# Patient Record
Sex: FEMALE | Race: ASIAN | NOT HISPANIC OR LATINO | URBAN - METROPOLITAN AREA
[De-identification: names, ages, dates, MRNs, and addresses within clinical notes are randomized per-mention and may not be internally consistent; named-entity substitution may affect disease eponyms.]

---

## 2024-03-05 ENCOUNTER — EMERGENCY (EMERGENCY)
Facility: HOSPITAL | Age: 44
LOS: 1 days | Discharge: HOME | End: 2024-03-05
Admitting: EMERGENCY MEDICINE
Payer: MEDICAID

## 2024-03-05 VITALS
SYSTOLIC BLOOD PRESSURE: 115 MMHG | TEMPERATURE: 98 F | OXYGEN SATURATION: 97 % | RESPIRATION RATE: 17 BRPM | HEART RATE: 101 BPM | DIASTOLIC BLOOD PRESSURE: 78 MMHG

## 2024-03-05 LAB
ALBUMIN SERPL ELPH-MCNC: 4.1 G/DL — SIGNIFICANT CHANGE UP (ref 3.3–5)
ALP SERPL-CCNC: 55 U/L — SIGNIFICANT CHANGE UP (ref 40–120)
ALT FLD-CCNC: 8 U/L — SIGNIFICANT CHANGE UP (ref 4–33)
ANION GAP SERPL CALC-SCNC: 13 MMOL/L — SIGNIFICANT CHANGE UP (ref 7–14)
AST SERPL-CCNC: 11 U/L — SIGNIFICANT CHANGE UP (ref 4–32)
BILIRUB SERPL-MCNC: 0.6 MG/DL — SIGNIFICANT CHANGE UP (ref 0.2–1.2)
BUN SERPL-MCNC: 8 MG/DL — SIGNIFICANT CHANGE UP (ref 7–23)
CALCIUM SERPL-MCNC: 9.6 MG/DL — SIGNIFICANT CHANGE UP (ref 8.4–10.5)
CHLORIDE SERPL-SCNC: 104 MMOL/L — SIGNIFICANT CHANGE UP (ref 98–107)
CO2 SERPL-SCNC: 23 MMOL/L — SIGNIFICANT CHANGE UP (ref 22–31)
CREAT SERPL-MCNC: 0.5 MG/DL — SIGNIFICANT CHANGE UP (ref 0.5–1.3)
EGFR: 119 ML/MIN/1.73M2 — SIGNIFICANT CHANGE UP
ERYTHROCYTE [SEDIMENTATION RATE] IN BLOOD: 26 MM/HR — HIGH (ref 4–25)
GLUCOSE SERPL-MCNC: 91 MG/DL — SIGNIFICANT CHANGE UP (ref 70–99)
HCT VFR BLD CALC: 36.7 % — SIGNIFICANT CHANGE UP (ref 34.5–45)
HGB BLD-MCNC: 12.3 G/DL — SIGNIFICANT CHANGE UP (ref 11.5–15.5)
MCHC RBC-ENTMCNC: 28.3 PG — SIGNIFICANT CHANGE UP (ref 27–34)
MCHC RBC-ENTMCNC: 33.5 GM/DL — SIGNIFICANT CHANGE UP (ref 32–36)
MCV RBC AUTO: 84.4 FL — SIGNIFICANT CHANGE UP (ref 80–100)
NRBC # BLD: 0 /100 WBCS — SIGNIFICANT CHANGE UP (ref 0–0)
NRBC # FLD: 0 K/UL — SIGNIFICANT CHANGE UP (ref 0–0)
PLATELET # BLD AUTO: 235 K/UL — SIGNIFICANT CHANGE UP (ref 150–400)
POTASSIUM SERPL-MCNC: 4 MMOL/L — SIGNIFICANT CHANGE UP (ref 3.5–5.3)
POTASSIUM SERPL-SCNC: 4 MMOL/L — SIGNIFICANT CHANGE UP (ref 3.5–5.3)
PROT SERPL-MCNC: 7.7 G/DL — SIGNIFICANT CHANGE UP (ref 6–8.3)
RBC # BLD: 4.35 M/UL — SIGNIFICANT CHANGE UP (ref 3.8–5.2)
RBC # FLD: 12.9 % — SIGNIFICANT CHANGE UP (ref 10.3–14.5)
SODIUM SERPL-SCNC: 140 MMOL/L — SIGNIFICANT CHANGE UP (ref 135–145)
URATE SERPL-MCNC: 3.6 MG/DL — SIGNIFICANT CHANGE UP (ref 2.5–7)
WBC # BLD: 7.12 K/UL — SIGNIFICANT CHANGE UP (ref 3.8–10.5)
WBC # FLD AUTO: 7.12 K/UL — SIGNIFICANT CHANGE UP (ref 3.8–10.5)

## 2024-03-05 PROCEDURE — 73600 X-RAY EXAM OF ANKLE: CPT | Mod: 26,RT

## 2024-03-05 PROCEDURE — 73620 X-RAY EXAM OF FOOT: CPT | Mod: 26,RT

## 2024-03-05 PROCEDURE — 99284 EMERGENCY DEPT VISIT MOD MDM: CPT

## 2024-03-05 RX ORDER — IBUPROFEN 200 MG
600 TABLET ORAL ONCE
Refills: 0 | Status: COMPLETED | OUTPATIENT
Start: 2024-03-05 | End: 2024-03-05

## 2024-03-05 RX ADMIN — Medication 600 MILLIGRAM(S): at 17:47

## 2024-03-05 NOTE — ED PROVIDER NOTE - OBJECTIVE STATEMENT
Accompanied with her  in which he requests Divehi translation  43-year-old female with no significant medical history presents with right ankle and foot pain x 4 days.  States she awoke Friday morning with the pain denying any known injury or trauma prior.  Has been taking Motrin intermittently with mild relief.  Very difficult to bear weight.  Denies history of gout.  Denies fever, chills, abdominal pain, nausea, vomiting, calf pain, shortness of breath, recent travel, OCP use, weakness, numbness, tingling.

## 2024-03-05 NOTE — ED PROVIDER NOTE - CLINICAL SUMMARY MEDICAL DECISION MAKING FREE TEXT BOX
Accompanied with her  in which he requests Tajik translation  43-year-old female with no significant medical history presents with right ankle and foot pain x 4 days.  States she awoke Friday morning with the pain denying any known injury or trauma prior.  Has been taking Motrin intermittently with mild relief.  Very difficult to bear weight.  Denies history of gout.  Denies fever, chills, abdominal pain, nausea, vomiting, calf pain, shortness of breath, recent travel, OCP use, weakness, numbness, tingling.  PE: FROM, mild swelling overlying the lateral malleolus into the lateral foot with a mild dusky erythema to the area  DDX to consider but not limited to: Ankle/foot injury, Gout, Infection  Plan: Will check basic labs, ESR, Uric acid, give Motrin, check xray ankle and foot Accompanied with her  in which he requests French translation  43-year-old female with no significant medical history presents with right ankle and foot pain x 4 days.  States she awoke Friday morning with the pain denying any known injury or trauma prior.  Has been taking Motrin intermittently with mild relief.  Very difficult to bear weight.  Denies history of gout.  Denies fever, chills, abdominal pain, nausea, vomiting, calf pain, shortness of breath, recent travel, OCP use, weakness, numbness, tingling.  PE: FROM, mild swelling overlying the lateral malleolus into the lateral foot with a mild dusky erythema to the area  DDX to consider but not limited to: Ankle/foot injury, Gout, Infection  Plan: Will check basic labs, ESR, Uric acid, give Motrin, check xray ankle and foot  Update: Xray ankle/foot revealing soft tissue swelling, no acute fx. Labs WNL. Aircast applied. Patient already had cane. Will DC with Ortho follow up

## 2024-03-05 NOTE — ED PROVIDER NOTE - PATIENT PORTAL LINK FT
You can access the FollowMyHealth Patient Portal offered by [facility] by registering at the following website: [email or web address]. By joining [facility]’s FollowMyHealth portal, you will also be able to view your health information using other applications (apps) compatible with our system.

## 2024-03-05 NOTE — ED PROVIDER NOTE - NSFOLLOWUPINSTRUCTIONS_ED_ALL_ED_FT
Follow up with your PMD within 24-48 hrs.   Rest, no heavy lifting.   Apply ice for 20 minutes 2-3 times/day as needed for pain and swelling.  Take Motrin 400mg every 6-8 hrs with food as needed for pain.    Worsening, continued or ANY new concerning symptoms return to the emergency department.    Musculoskeletal Pain    Musculoskeletal pain refers to aches and pains in your bones, joints, muscles, and the tissues that surround them. This pain can occur in any part of the body. It can last for a short time (acute) or a long time (chronic).  A physical exam, lab tests, and imaging studies may be done to find the cause of your musculoskeletal pain.  Follow these instructions at home:     Lifestyle     Try to control or lower your stress levels. Stress increases muscle tension and can worsen musculoskeletal pain. It is important to recognize when you are anxious or stressed and learn ways to manage it. This may include:  Meditation or yoga.Cognitive or behavioral therapy.Acupuncture or massage therapy.You may continue all activities unless the activities cause more pain. When the pain gets better, slowly resume your normal activities. Gradually increase the intensity and duration of your activities or exercise.Managing pain, stiffness, and swelling     Take over-the-counter and prescription medicines only as told by your health care provider.When your pain is severe, bed rest may be helpful. Lie or sit in any position that is comfortable, but get out of bed and walk around at least every couple of hours.If directed, apply heat to the affected area as often as told by your health care provider. Use the heat source that your health care provider recommends, such as a moist heat pack or a heating pad.  Place a towel between your skin and the heat source.Leave the heat on for 20–30 minutes.Remove the heat if your skin turns bright red. This is especially important if you are unable to feel pain, heat, or cold. You may have a greater risk of getting burned.If directed, put ice on the painful area.  Put ice in a plastic bag.Place a towel between your skin and the bag.Leave the ice on for 20 minutes, 2–3 times a day.General instructions     Your health care provider may recommend that you see a physical therapist. This person can help you come up with a safe exercise program. Do any exercises as told by your physical therapist.Keep all follow-up visits, including any physical therapy visits, as told by your health care providers. This is important.Contact a health care provider if:  Your pain gets worse.Medicines do not help ease your pain.You cannot use the part of your body that hurts, such as your arm, leg, or neck.You have trouble sleeping.You have trouble doing your normal activities.Get help right away if:  You have a new injury and your pain is worse or different.You feel numb or you have tingling in the painful area.  Musculoskeletal pain refers to aches and pains in your bones, joints, muscles, and the tissues that surround them.This pain can occur in any part of the body.Your health care provider may recommend that you see a physical therapist. This person can help you come up with a safe exercise program. Do any exercises as told by your physical therapist.Lower your stress level. Stress can worsen musculoskeletal pain. Ways to lower stress may include meditation, yoga, cognitive or behavioral therapy, acupuncture, and massage therapy. Follow up with your PMD within 48-72 hours.  Rest and elevate your ankle.    Apply ice 20 minutes on 3x/day.   Keep aircast on during the day for support for the next week. Ambulate as tolerated using cane assistance.   Take Motrin 400mg every 6-8hrs for pain with food.    Worsening, continued or ANY new concerning symptoms return to the emergency department.   For further Orthopedic evaluation you can call: Find a Physician helpline ([phone]) for assistance    Ankle Sprain     An ankle sprain is a stretch or tear in a ligament in the ankle. Ligaments are tissues that connect bones to each other.  The two most common types of ankle sprains are:  Inversion sprain. This happens when the foot turns inward and the ankle rolls outward. It affects the ligament on the outside of the foot (lateral ligament).Eversion sprain. This happens when the foot turns outward and the ankle rolls inward. It affects the ligament on the inner side of the foot (medial ligament).What are the causes?  This condition is often caused by accidentally rolling or twisting the ankle.  What increases the risk?  You are more likely to develop this condition if you play sports.  What are the signs or symptoms?  Symptoms of this condition include:  Pain in your ankle.Swelling.Bruising. This may develop right after you sprain your ankle or 1–2 days later.Trouble standing or walking, especially when you turn or change directions.How is this diagnosed?  This condition is diagnosed with:  A physical exam. During the exam, your health care provider will press on certain parts of your foot and ankle and try to move them in certain ways.X-ray imaging. These may be taken to see how severe the sprain is and to check for broken bones.How is this treated?  This condition may be treated with:  A brace or splint. This is used to keep the ankle from moving until it heals.An elastic bandage. This is used to support the ankle.Crutches.Pain medicine.Surgery. This may be needed if the sprain is severe.Physical therapy. This may help to improve the range of motion in the ankle.Follow these instructions at home:  If you have a brace or a splint:     Wear the brace or splint as told by your health care provider. Remove it only as told by your health care provider.Loosen the brace or splint if your toes tingle, become numb, or turn cold and blue.Keep the brace or splint clean.If the brace or splint is not waterproof:  Do not let it get wet. Cover it with a watertight covering when you take a bath or a shower.If you have an elastic bandage (dressing):     Remove it to shower or bathe.Try not to move your ankle much, but wiggle your toes from time to time. This helps to prevent swelling.Adjust the dressing to make it more comfortable if it feels too tight.Loosen the dressing if you have numbness or tingling in your foot, or if your foot becomes cold and blue.Managing pain, stiffness, and swelling        Take over-the-counter and prescription medicines only as told by your health care provider.For 2–3 days, keep your ankle raised (elevated) above the level of your heart as much as possible.If directed, put ice on the injured area:  If you have a removable brace or splint, remove it as told by your health care provider.Put ice in a plastic bag.Place a towel between your skin and the bag.Leave the ice on for 20 minutes, 2–3 times a day.General instructions     Rest your ankle.Do not use the injured limb to support your body weight until your health care provider says that you can. Use crutches as told by your health care provider.Do not use any products that contain nicotine or tobacco, such as cigarettes, e-cigarettes, and chewing tobacco. If you need help quitting, ask your health care provider.Keep all follow-up visits as told by your health care provider. This is important.Contact a health care provider if:  You have rapidly increasing bruising or swelling.Your pain is not relieved with medicine.Get help right away if:  Your foot or toes become numb or blue.You have severe pain that gets worse.Summary  An ankle sprain is a stretch or tear in a ligament in the ankle. Ligaments are tissues that connect bones to each other.This condition is often caused by accidentally rolling or twisting the ankle.Symptoms include pain, swelling, bruising, and trouble walking.To relieve pain and swelling, put ice on the affected ankle, raise your ankle above the level of your heart, and use an elastic bandage.Keep all follow-up visits as told by your health care provider. This is important.

## 2024-03-05 NOTE — ED PROVIDER NOTE - LOWER EXTREMITY EXAM, RIGHT
TTP lateral malleolus with swelling extending into the foot with mild light erythema to the area/SWELLING/TENDERNESS